# Patient Record
Sex: FEMALE | Race: OTHER
[De-identification: names, ages, dates, MRNs, and addresses within clinical notes are randomized per-mention and may not be internally consistent; named-entity substitution may affect disease eponyms.]

---

## 2020-02-04 ENCOUNTER — ASOB RESULT (OUTPATIENT)
Age: 25
End: 2020-02-04

## 2020-02-04 ENCOUNTER — APPOINTMENT (OUTPATIENT)
Dept: ANTEPARTUM | Facility: CLINIC | Age: 25
End: 2020-02-04
Payer: MEDICAID

## 2020-02-04 PROBLEM — Z00.00 ENCOUNTER FOR PREVENTIVE HEALTH EXAMINATION: Status: ACTIVE | Noted: 2020-02-04

## 2020-02-04 PROCEDURE — 76805 OB US >/= 14 WKS SNGL FETUS: CPT

## 2020-02-04 PROCEDURE — 76817 TRANSVAGINAL US OBSTETRIC: CPT

## 2020-02-25 ENCOUNTER — APPOINTMENT (OUTPATIENT)
Dept: PEDIATRIC CARDIOLOGY | Facility: CLINIC | Age: 25
End: 2020-02-25
Payer: MEDICAID

## 2020-02-25 DIAGNOSIS — O35.9XX0 MATERNAL CARE FOR (SUSPECTED) FETAL ABNORMALITY AND DAMAGE, UNSPECIFIED, NOT APPLICABLE OR UNSPECIFIED: ICD-10-CM

## 2020-02-25 DIAGNOSIS — Z3A.23 23 WEEKS GESTATION OF PREGNANCY: ICD-10-CM

## 2020-02-25 DIAGNOSIS — O09.899 SUPERVISION OF OTHER HIGH RISK PREGNANCIES, UNSPECIFIED TRIMESTER: ICD-10-CM

## 2020-02-25 PROCEDURE — 93325 DOPPLER ECHO COLOR FLOW MAPG: CPT | Mod: 59

## 2020-02-25 PROCEDURE — 76825 ECHO EXAM OF FETAL HEART: CPT

## 2020-02-25 PROCEDURE — 76827 ECHO EXAM OF FETAL HEART: CPT

## 2020-02-25 PROCEDURE — 99204 OFFICE O/P NEW MOD 45 MIN: CPT | Mod: 25

## 2020-02-25 PROCEDURE — 76821 MIDDLE CEREBRAL ARTERY ECHO: CPT

## 2020-02-25 PROCEDURE — 76820 UMBILICAL ARTERY ECHO: CPT

## 2020-02-25 RX ORDER — GLUC/MSM/COLGN2/HYAL/ANTIARTH3 375-375-20
TABLET ORAL
Refills: 0 | Status: ACTIVE | COMMUNITY

## 2020-02-25 RX ORDER — CHROMIUM 200 MCG
TABLET ORAL
Refills: 0 | Status: ACTIVE | COMMUNITY

## 2020-02-25 RX ORDER — PNV/FERROUS SULFATE/FOLIC ACID 27-<0.5MG
TABLET ORAL
Refills: 0 | Status: ACTIVE | COMMUNITY

## 2020-03-31 ENCOUNTER — APPOINTMENT (OUTPATIENT)
Dept: ANTEPARTUM | Facility: CLINIC | Age: 25
End: 2020-03-31

## 2020-04-07 ENCOUNTER — APPOINTMENT (OUTPATIENT)
Dept: ANTEPARTUM | Facility: CLINIC | Age: 25
End: 2020-04-07
Payer: MEDICAID

## 2020-04-07 ENCOUNTER — ASOB RESULT (OUTPATIENT)
Age: 25
End: 2020-04-07

## 2020-04-07 PROCEDURE — 76816 OB US FOLLOW-UP PER FETUS: CPT

## 2020-05-13 ENCOUNTER — APPOINTMENT (OUTPATIENT)
Dept: ANTEPARTUM | Facility: CLINIC | Age: 25
End: 2020-05-13
Payer: MEDICAID

## 2020-05-13 ENCOUNTER — ASOB RESULT (OUTPATIENT)
Age: 25
End: 2020-05-13

## 2020-05-13 PROCEDURE — 76816 OB US FOLLOW-UP PER FETUS: CPT

## 2020-05-13 PROCEDURE — 76819 FETAL BIOPHYS PROFIL W/O NST: CPT

## 2020-05-13 PROCEDURE — 76820 UMBILICAL ARTERY ECHO: CPT

## 2020-06-10 ENCOUNTER — APPOINTMENT (OUTPATIENT)
Dept: ANTEPARTUM | Facility: CLINIC | Age: 25
End: 2020-06-10
Payer: MEDICAID

## 2020-06-10 ENCOUNTER — ASOB RESULT (OUTPATIENT)
Age: 25
End: 2020-06-10

## 2020-06-10 PROCEDURE — 76819 FETAL BIOPHYS PROFIL W/O NST: CPT

## 2020-06-10 PROCEDURE — 76816 OB US FOLLOW-UP PER FETUS: CPT

## 2020-06-10 PROCEDURE — 76820 UMBILICAL ARTERY ECHO: CPT

## 2020-06-15 ENCOUNTER — OUTPATIENT (OUTPATIENT)
Dept: OUTPATIENT SERVICES | Facility: HOSPITAL | Age: 25
LOS: 1 days | End: 2020-06-15
Payer: MEDICAID

## 2020-06-15 DIAGNOSIS — Z01.818 ENCOUNTER FOR OTHER PREPROCEDURAL EXAMINATION: ICD-10-CM

## 2020-06-15 LAB
BASOPHILS # BLD AUTO: 0.03 K/UL — SIGNIFICANT CHANGE UP (ref 0–0.2)
BASOPHILS NFR BLD AUTO: 0.4 % — SIGNIFICANT CHANGE UP (ref 0–2)
EOSINOPHIL # BLD AUTO: 0.07 K/UL — SIGNIFICANT CHANGE UP (ref 0–0.5)
EOSINOPHIL NFR BLD AUTO: 0.9 % — SIGNIFICANT CHANGE UP (ref 0–6)
HCT VFR BLD CALC: 34.7 % — SIGNIFICANT CHANGE UP (ref 34.5–45)
HGB BLD-MCNC: 10.2 G/DL — LOW (ref 11.5–15.5)
IMM GRANULOCYTES NFR BLD AUTO: 0.6 % — SIGNIFICANT CHANGE UP (ref 0–1.5)
LYMPHOCYTES # BLD AUTO: 1.86 K/UL — SIGNIFICANT CHANGE UP (ref 1–3.3)
LYMPHOCYTES # BLD AUTO: 23.5 % — SIGNIFICANT CHANGE UP (ref 13–44)
MCHC RBC-ENTMCNC: 22.9 PG — LOW (ref 27–34)
MCHC RBC-ENTMCNC: 29.4 GM/DL — LOW (ref 32–36)
MCV RBC AUTO: 77.8 FL — LOW (ref 80–100)
MONOCYTES # BLD AUTO: 0.57 K/UL — SIGNIFICANT CHANGE UP (ref 0–0.9)
MONOCYTES NFR BLD AUTO: 7.2 % — SIGNIFICANT CHANGE UP (ref 2–14)
NEUTROPHILS # BLD AUTO: 5.34 K/UL — SIGNIFICANT CHANGE UP (ref 1.8–7.4)
NEUTROPHILS NFR BLD AUTO: 67.4 % — SIGNIFICANT CHANGE UP (ref 43–77)
PLATELET # BLD AUTO: 225 K/UL — SIGNIFICANT CHANGE UP (ref 150–400)
RBC # BLD: 4.46 M/UL — SIGNIFICANT CHANGE UP (ref 3.8–5.2)
RBC # FLD: 16.8 % — HIGH (ref 10.3–14.5)
WBC # BLD: 7.92 K/UL — SIGNIFICANT CHANGE UP (ref 3.8–10.5)
WBC # FLD AUTO: 7.92 K/UL — SIGNIFICANT CHANGE UP (ref 3.8–10.5)

## 2020-06-15 PROCEDURE — 86900 BLOOD TYPING SEROLOGIC ABO: CPT

## 2020-06-15 PROCEDURE — 85025 COMPLETE CBC W/AUTO DIFF WBC: CPT

## 2020-06-15 PROCEDURE — 36415 COLL VENOUS BLD VENIPUNCTURE: CPT

## 2020-06-15 PROCEDURE — 86920 COMPATIBILITY TEST SPIN: CPT

## 2020-06-15 PROCEDURE — 86901 BLOOD TYPING SEROLOGIC RH(D): CPT

## 2020-06-15 PROCEDURE — 86850 RBC ANTIBODY SCREEN: CPT

## 2020-06-15 PROCEDURE — U0003: CPT

## 2020-06-16 DIAGNOSIS — Z01.818 ENCOUNTER FOR OTHER PREPROCEDURAL EXAMINATION: ICD-10-CM

## 2020-06-16 LAB — SARS-COV-2 RNA SPEC QL NAA+PROBE: SIGNIFICANT CHANGE UP

## 2020-06-17 ENCOUNTER — INPATIENT (INPATIENT)
Facility: HOSPITAL | Age: 25
LOS: 2 days | Discharge: HOME | DRG: 540 | End: 2020-06-20
Attending: OBSTETRICS & GYNECOLOGY | Admitting: OBSTETRICS & GYNECOLOGY
Payer: MEDICAID

## 2020-06-17 ENCOUNTER — TRANSCRIPTION ENCOUNTER (OUTPATIENT)
Age: 25
End: 2020-06-17

## 2020-06-17 VITALS — HEIGHT: 65 IN | WEIGHT: 165.35 LBS

## 2020-06-17 DIAGNOSIS — Z3A.00 WEEKS OF GESTATION OF PREGNANCY NOT SPECIFIED: ICD-10-CM

## 2020-06-17 LAB — T PALLIDUM AB TITR SER: NEGATIVE — SIGNIFICANT CHANGE UP

## 2020-06-17 PROCEDURE — 59050 FETAL MONITOR W/REPORT: CPT

## 2020-06-17 PROCEDURE — 36415 COLL VENOUS BLD VENIPUNCTURE: CPT

## 2020-06-17 PROCEDURE — 59514 CESAREAN DELIVERY ONLY: CPT | Mod: 80,U7

## 2020-06-17 PROCEDURE — 86780 TREPONEMA PALLIDUM: CPT

## 2020-06-17 PROCEDURE — 94760 N-INVAS EAR/PLS OXIMETRY 1: CPT

## 2020-06-17 PROCEDURE — 85025 COMPLETE CBC W/AUTO DIFF WBC: CPT

## 2020-06-17 RX ORDER — ACETAMINOPHEN 500 MG
1000 TABLET ORAL ONCE
Refills: 0 | Status: COMPLETED | OUTPATIENT
Start: 2020-06-17 | End: 2020-06-17

## 2020-06-17 RX ORDER — TETANUS TOXOID, REDUCED DIPHTHERIA TOXOID AND ACELLULAR PERTUSSIS VACCINE, ADSORBED 5; 2.5; 8; 8; 2.5 [IU]/.5ML; [IU]/.5ML; UG/.5ML; UG/.5ML; UG/.5ML
0.5 SUSPENSION INTRAMUSCULAR ONCE
Refills: 0 | Status: DISCONTINUED | OUTPATIENT
Start: 2020-06-17 | End: 2020-06-20

## 2020-06-17 RX ORDER — ENOXAPARIN SODIUM 100 MG/ML
40 INJECTION SUBCUTANEOUS DAILY
Refills: 0 | Status: DISCONTINUED | OUTPATIENT
Start: 2020-06-17 | End: 2020-06-20

## 2020-06-17 RX ORDER — METOCLOPRAMIDE HCL 10 MG
10 TABLET ORAL ONCE
Refills: 0 | Status: COMPLETED | OUTPATIENT
Start: 2020-06-17 | End: 2020-06-17

## 2020-06-17 RX ORDER — OXYCODONE HYDROCHLORIDE 5 MG/1
5 TABLET ORAL
Refills: 0 | Status: DISCONTINUED | OUTPATIENT
Start: 2020-06-17 | End: 2020-06-20

## 2020-06-17 RX ORDER — SODIUM CHLORIDE 9 MG/ML
1000 INJECTION, SOLUTION INTRAVENOUS ONCE
Refills: 0 | Status: COMPLETED | OUTPATIENT
Start: 2020-06-17 | End: 2020-06-17

## 2020-06-17 RX ORDER — DIPHENHYDRAMINE HCL 50 MG
25 CAPSULE ORAL EVERY 6 HOURS
Refills: 0 | Status: DISCONTINUED | OUTPATIENT
Start: 2020-06-17 | End: 2020-06-20

## 2020-06-17 RX ORDER — IBUPROFEN 200 MG
600 TABLET ORAL EVERY 6 HOURS
Refills: 0 | Status: DISCONTINUED | OUTPATIENT
Start: 2020-06-17 | End: 2020-06-20

## 2020-06-17 RX ORDER — OXYCODONE HYDROCHLORIDE 5 MG/1
5 TABLET ORAL ONCE
Refills: 0 | Status: DISCONTINUED | OUTPATIENT
Start: 2020-06-17 | End: 2020-06-20

## 2020-06-17 RX ORDER — ONDANSETRON 8 MG/1
4 TABLET, FILM COATED ORAL EVERY 6 HOURS
Refills: 0 | Status: DISCONTINUED | OUTPATIENT
Start: 2020-06-17 | End: 2020-06-20

## 2020-06-17 RX ORDER — HYDROMORPHONE HYDROCHLORIDE 2 MG/ML
1 INJECTION INTRAMUSCULAR; INTRAVENOUS; SUBCUTANEOUS
Refills: 0 | Status: DISCONTINUED | OUTPATIENT
Start: 2020-06-17 | End: 2020-06-20

## 2020-06-17 RX ORDER — SODIUM CHLORIDE 9 MG/ML
1000 INJECTION, SOLUTION INTRAVENOUS
Refills: 0 | Status: DISCONTINUED | OUTPATIENT
Start: 2020-06-17 | End: 2020-06-17

## 2020-06-17 RX ORDER — NALOXONE HYDROCHLORIDE 4 MG/.1ML
0.1 SPRAY NASAL
Refills: 0 | Status: DISCONTINUED | OUTPATIENT
Start: 2020-06-17 | End: 2020-06-20

## 2020-06-17 RX ORDER — ACETAMINOPHEN 500 MG
975 TABLET ORAL
Refills: 0 | Status: DISCONTINUED | OUTPATIENT
Start: 2020-06-17 | End: 2020-06-20

## 2020-06-17 RX ORDER — OXYCODONE HYDROCHLORIDE 5 MG/1
10 TABLET ORAL
Refills: 0 | Status: DISCONTINUED | OUTPATIENT
Start: 2020-06-17 | End: 2020-06-20

## 2020-06-17 RX ORDER — SIMETHICONE 80 MG/1
80 TABLET, CHEWABLE ORAL EVERY 4 HOURS
Refills: 0 | Status: DISCONTINUED | OUTPATIENT
Start: 2020-06-17 | End: 2020-06-20

## 2020-06-17 RX ORDER — OXYTOCIN 10 UNIT/ML
333.33 VIAL (ML) INJECTION
Qty: 20 | Refills: 0 | Status: DISCONTINUED | OUTPATIENT
Start: 2020-06-17 | End: 2020-06-17

## 2020-06-17 RX ORDER — FAMOTIDINE 10 MG/ML
20 INJECTION INTRAVENOUS ONCE
Refills: 0 | Status: COMPLETED | OUTPATIENT
Start: 2020-06-17 | End: 2020-06-17

## 2020-06-17 RX ORDER — MORPHINE SULFATE 50 MG/1
0.15 CAPSULE, EXTENDED RELEASE ORAL ONCE
Refills: 0 | Status: DISCONTINUED | OUTPATIENT
Start: 2020-06-17 | End: 2020-06-20

## 2020-06-17 RX ORDER — LANOLIN
1 OINTMENT (GRAM) TOPICAL EVERY 6 HOURS
Refills: 0 | Status: DISCONTINUED | OUTPATIENT
Start: 2020-06-17 | End: 2020-06-20

## 2020-06-17 RX ORDER — KETOROLAC TROMETHAMINE 30 MG/ML
30 SYRINGE (ML) INJECTION ONCE
Refills: 0 | Status: DISCONTINUED | OUTPATIENT
Start: 2020-06-17 | End: 2020-06-20

## 2020-06-17 RX ORDER — CITRIC ACID/SODIUM CITRATE 300-500 MG
30 SOLUTION, ORAL ORAL ONCE
Refills: 0 | Status: COMPLETED | OUTPATIENT
Start: 2020-06-17 | End: 2020-06-17

## 2020-06-17 RX ORDER — MAGNESIUM HYDROXIDE 400 MG/1
30 TABLET, CHEWABLE ORAL
Refills: 0 | Status: DISCONTINUED | OUTPATIENT
Start: 2020-06-17 | End: 2020-06-20

## 2020-06-17 RX ORDER — IBUPROFEN 200 MG
600 TABLET ORAL EVERY 6 HOURS
Refills: 0 | Status: COMPLETED | OUTPATIENT
Start: 2020-06-17 | End: 2021-05-16

## 2020-06-17 RX ORDER — OXYTOCIN 10 UNIT/ML
333.33 VIAL (ML) INJECTION
Qty: 20 | Refills: 0 | Status: DISCONTINUED | OUTPATIENT
Start: 2020-06-17 | End: 2020-06-20

## 2020-06-17 RX ADMIN — Medication 975 MILLIGRAM(S): at 21:09

## 2020-06-17 RX ADMIN — Medication 10 MILLIGRAM(S): at 08:21

## 2020-06-17 RX ADMIN — ENOXAPARIN SODIUM 40 MILLIGRAM(S): 100 INJECTION SUBCUTANEOUS at 21:09

## 2020-06-17 RX ADMIN — Medication 30 MILLILITER(S): at 08:50

## 2020-06-17 RX ADMIN — OXYCODONE HYDROCHLORIDE 5 MILLIGRAM(S): 5 TABLET ORAL at 21:08

## 2020-06-17 RX ADMIN — FAMOTIDINE 20 MILLIGRAM(S): 10 INJECTION INTRAVENOUS at 08:23

## 2020-06-17 RX ADMIN — SODIUM CHLORIDE 2000 MILLILITER(S): 9 INJECTION, SOLUTION INTRAVENOUS at 08:16

## 2020-06-17 RX ADMIN — Medication 400 MILLIGRAM(S): at 11:15

## 2020-06-17 RX ADMIN — Medication 1000 MILLIUNIT(S)/MIN: at 12:07

## 2020-06-17 NOTE — DISCHARGE NOTE OB - PLAN OF CARE
recovery no heavy lifting for six weeks.  Follow up in office as scheduled, 7 days after surgery for postop wound check.

## 2020-06-17 NOTE — DISCHARGE NOTE OB - PATIENT PORTAL LINK FT
You can access the FollowMyHealth Patient Portal offered by [facility] by registering at the following website: [email or web address]. By joining [facility]’s FollowMyHealth portal, you will also be able to view your health information using other applications (apps) compatible with our system.

## 2020-06-17 NOTE — DISCHARGE NOTE OB - CARE PLAN
Principal Discharge DX:	 delivery, delivered, current hospitalization  Goal:	recovery  Assessment and plan of treatment:	no heavy lifting for six weeks.  Follow up in office as scheduled, 7 days after surgery for postop wound check.

## 2020-06-17 NOTE — DISCHARGE NOTE OB - CARE PROVIDER_API CALL
Genaro Carrion  OBSTETRICS AND GYNECOLOGY  [address] [place] [address]  Phone: [phone]  Fax: [phone]  Established Patient  Follow Up Time: 1 week

## 2020-06-17 NOTE — PATIENT PROFILE OB - ALERT: PERTINENT HISTORY
Follow up Sonogram for Growth/Ultra Screen at 12 Weeks/20 Week Level II Sonogram/BioPhysical Profile(s)/1st Trimester Sonogram/Fetal Non-Stress Test (NST)/Fetal Sonogram

## 2020-06-17 NOTE — DISCHARGE NOTE OB - MEDICATION SUMMARY - MEDICATIONS TO TAKE
I will START or STAY ON the medications listed below when I get home from the hospital:    Prenatal 1 Plus 1 oral tablet  -- 1 tab(s) by mouth once a day  -- Indication: For Continue daily

## 2020-06-17 NOTE — DISCHARGE NOTE OB - HOSPITAL COURSE
26 yo  at 39 3/7 weeks gestation, GBS negative presents for scheduled repeat  with h/o two previous c-sections, declining a TOLAC.  Her CBC upon admission was:                        10.2   7.92  )-----------( 225      ( 15 2020 10:03 )             34.7   Covid testing was negative. 26 yo  at 39 3/7 weeks gestation, GBS negative presents for scheduled repeat  with h/o two previous c-sections, declining a TOLAC.  Her CBC upon admission was:                        10.2   7.92  )-----------( 225      ( 15 2020 10:03 )             34.7   Covid testing was negative.  An uncomplicated repeat  was performed under spinal anaesthesia delivering a viable male infant at 0926am,  from OT position, with clear fluid, no nuchal cord, weighing 3840g (AGA), measuring 20 inches in length, Apgars 9/9. 24 yo  at 39 3/7 weeks gestation, GBS negative presents for scheduled repeat  with h/o two previous c-sections, declining a TOLAC.  Her CBC upon admission was:                        10.2   7.92  )-----------( 225      ( 15 2020 10:03 )             34.7   Covid testing was negative.  An uncomplicated repeat  was performed under spinal anaesthesia delivering a viable male infant at 0926am,  from OT position, with clear fluid, no nuchal cord, weighing 3840g (AGA), measuring 20 inches in length, Apgars 9/9.  Pt's recovery was uncomplicated.  Her postop CBC:  9.3/32/6.  She is being discharged home in stable condition with her  infant.

## 2020-06-18 LAB
BASOPHILS # BLD AUTO: 0.04 K/UL — SIGNIFICANT CHANGE UP (ref 0–0.2)
BASOPHILS NFR BLD AUTO: 0.5 % — SIGNIFICANT CHANGE UP (ref 0–2)
EOSINOPHIL # BLD AUTO: 0.05 K/UL — SIGNIFICANT CHANGE UP (ref 0–0.5)
EOSINOPHIL NFR BLD AUTO: 0.6 % — SIGNIFICANT CHANGE UP (ref 0–6)
HCT VFR BLD CALC: 32.6 % — LOW (ref 34.5–45)
HGB BLD-MCNC: 9.3 G/DL — LOW (ref 11.5–15.5)
IMM GRANULOCYTES NFR BLD AUTO: 0.5 % — SIGNIFICANT CHANGE UP (ref 0–1.5)
LYMPHOCYTES # BLD AUTO: 1.89 K/UL — SIGNIFICANT CHANGE UP (ref 1–3.3)
LYMPHOCYTES # BLD AUTO: 22.9 % — SIGNIFICANT CHANGE UP (ref 13–44)
MCHC RBC-ENTMCNC: 22.1 PG — LOW (ref 27–34)
MCHC RBC-ENTMCNC: 28.5 GM/DL — LOW (ref 32–36)
MCV RBC AUTO: 77.6 FL — LOW (ref 80–100)
MONOCYTES # BLD AUTO: 0.74 K/UL — SIGNIFICANT CHANGE UP (ref 0–0.9)
MONOCYTES NFR BLD AUTO: 9 % — SIGNIFICANT CHANGE UP (ref 2–14)
NEUTROPHILS # BLD AUTO: 5.5 K/UL — SIGNIFICANT CHANGE UP (ref 1.8–7.4)
NEUTROPHILS NFR BLD AUTO: 66.5 % — SIGNIFICANT CHANGE UP (ref 43–77)
PLATELET # BLD AUTO: 194 K/UL — SIGNIFICANT CHANGE UP (ref 150–400)
RBC # BLD: 4.2 M/UL — SIGNIFICANT CHANGE UP (ref 3.8–5.2)
RBC # FLD: 16.6 % — HIGH (ref 10.3–14.5)
WBC # BLD: 8.26 K/UL — SIGNIFICANT CHANGE UP (ref 3.8–10.5)
WBC # FLD AUTO: 8.26 K/UL — SIGNIFICANT CHANGE UP (ref 3.8–10.5)

## 2020-06-18 RX ADMIN — Medication 975 MILLIGRAM(S): at 10:21

## 2020-06-18 RX ADMIN — ENOXAPARIN SODIUM 40 MILLIGRAM(S): 100 INJECTION SUBCUTANEOUS at 22:19

## 2020-06-18 RX ADMIN — Medication 600 MILLIGRAM(S): at 18:08

## 2020-06-18 RX ADMIN — Medication 600 MILLIGRAM(S): at 12:21

## 2020-06-18 RX ADMIN — Medication 975 MILLIGRAM(S): at 03:27

## 2020-06-18 RX ADMIN — Medication 975 MILLIGRAM(S): at 22:19

## 2020-06-18 RX ADMIN — OXYCODONE HYDROCHLORIDE 5 MILLIGRAM(S): 5 TABLET ORAL at 13:49

## 2020-06-18 RX ADMIN — Medication 600 MILLIGRAM(S): at 06:06

## 2020-06-18 RX ADMIN — Medication 975 MILLIGRAM(S): at 15:48

## 2020-06-18 RX ADMIN — Medication 600 MILLIGRAM(S): at 00:25

## 2020-06-18 NOTE — PROGRESS NOTE ADULT - SUBJECTIVE AND OBJECTIVE BOX
Postpartum Note,  Section  25y woman post-operative day:  #1    Subjective:  She is ambulating and tolerating regular diet.  Pt denies dizziness  She denies nausea and vomiting and is not yet passing flatus.    Physical exam:    Vital Signs Last 24 Hrs  T(C): 36.7 (2020 08:11), Max: 36.9 (2020 17:00)  T(F): 98 (2020 08:11), Max: 98.4 (2020 17:00)  HR: 63 (2020 08:11) (60 - 92)  BP: 91/56 (2020 08:11) (91/56 - 115/66)  BP(mean): 85 (2020 10:15) (75 - 88)  RR: 18 (2020 08:11) (16 - 23)  SpO2: 98% (2020 08:11) (96% - 100%)  Lungs:  clear to auscultation bilaterally.  Abdomen: Soft, nontender, nondistended, bowel sounds normoactive  Incision: Clean, dry, and intact.  Pelvic: light  lochia noted  Ext: No calf tenderness    LABS:                        9.3    8.26  )-----------( 194      ( 2020 06:59 )             32.6         Assessment and Plan  POD # 1  s/p     Doing well.  Encourage ambulation.

## 2020-06-19 RX ADMIN — Medication 600 MILLIGRAM(S): at 11:43

## 2020-06-19 RX ADMIN — Medication 600 MILLIGRAM(S): at 17:36

## 2020-06-19 RX ADMIN — Medication 975 MILLIGRAM(S): at 22:04

## 2020-06-19 RX ADMIN — Medication 600 MILLIGRAM(S): at 06:18

## 2020-06-19 RX ADMIN — Medication 975 MILLIGRAM(S): at 08:55

## 2020-06-19 RX ADMIN — ENOXAPARIN SODIUM 40 MILLIGRAM(S): 100 INJECTION SUBCUTANEOUS at 22:04

## 2020-06-19 RX ADMIN — Medication 600 MILLIGRAM(S): at 00:25

## 2020-06-19 RX ADMIN — Medication 975 MILLIGRAM(S): at 03:50

## 2020-06-19 NOTE — PROGRESS NOTE ADULT - SUBJECTIVE AND OBJECTIVE BOX
POD #2    Pt is a 26yo P3 s/p repeat .  Pt is doing well, pain is well controlled. Ambulating around her room this morning. Has  Breastfeeding, and bottle feeding.     PNC with Dr. Carrion  ICU Vital Signs Last 24 Hrs  T(C): 36.9 (2020 07:50), Max: 36.9 (2020 07:50)  T(F): 98.4 (2020 07:50), Max: 98.4 (2020 07:50)  HR: 60 (2020 07:50) (60 - 84)  BP: 113/74 (2020 07:50) (105/62 - 117/77)  RR: 18 (2020 07:50) (18 - 18)  SpO2: 98% (2020 07:50) (98% - 100%)             post-op             9.3    8.26  )-----------( 194      ( 2020 06:59 )             32.6     Breasts: soft nontender, lactating, no masses or red streaks  Heart: RRR  Lungs: CTAB  Abdomen: bowel sounds x 4 quadrants, fundus 1cm below umbilicus  Mariia: minimal lochia  Extremities: nontender

## 2020-06-19 NOTE — PROGRESS NOTE ADULT - ASSESSMENT
26yo P3 sp repeat , POD #2, normal exam  Encourage ambulation  Encourage exclusive breastfeeding  Routine postpartum care  Keep incision clean and dry

## 2020-06-20 VITALS
HEART RATE: 68 BPM | SYSTOLIC BLOOD PRESSURE: 123 MMHG | DIASTOLIC BLOOD PRESSURE: 76 MMHG | TEMPERATURE: 98 F | OXYGEN SATURATION: 98 % | RESPIRATION RATE: 18 BRPM

## 2020-06-20 RX ORDER — DOCUSATE SODIUM 100 MG
1 CAPSULE ORAL
Qty: 60 | Refills: 0
Start: 2020-06-20

## 2020-06-20 RX ADMIN — Medication 600 MILLIGRAM(S): at 06:09

## 2020-06-20 RX ADMIN — Medication 975 MILLIGRAM(S): at 09:26

## 2020-06-20 RX ADMIN — Medication 975 MILLIGRAM(S): at 15:27

## 2020-06-20 RX ADMIN — Medication 600 MILLIGRAM(S): at 12:04

## 2020-06-20 NOTE — PROGRESS NOTE ADULT - SUBJECTIVE AND OBJECTIVE BOX
Postpartum Note,  Section  25y woman post-operative day:  #3    Subjective:  She is ambulating and tolerating regular diet.  Pt denies dizziness  She denies nausea and vomiting and is passing flatus.    Physical exam:    Vital Signs Last 24 Hrs  T(C): 36.7 (2020 08:30), Max: 36.8 (2020 20:36)  T(F): 98 (2020 08:30), Max: 98.3 (2020 20:36)  HR: 68 (2020 08:30) (57 - 68)  BP: 123/76 (2020 08:30) (117/77 - 133/76)  BP(mean): 95 (2020 05:47) (95 - 95)  RR: 18 (2020 08:30) (18 - 18)  SpO2: 98% (2020 08:30) (98% - 99%)  Lungs:  clear to auscultation bilaterally.  Abdomen: Soft, nontender, nondistended, bowel sounds normoactive  Incision: Clean, dry, and intact.  Pelvic: light  lochia noted  Ext: No calf tenderness    LABS:        Assessment and Plan  POD # 3  s/p     Doing well.  Encourage ambulation.  Follow up in 4-7 days for post-op incision check, then in six weeks for postpartum appointment.  Pt advised to follow up sooner as an outpt if she has any difficulties with breast feeding/depression/temperature over 100.2'F.

## 2020-06-24 DIAGNOSIS — O34.211 MATERNAL CARE FOR LOW TRANSVERSE SCAR FROM PREVIOUS CESAREAN DELIVERY: ICD-10-CM

## 2020-06-24 DIAGNOSIS — Z28.09 IMMUNIZATION NOT CARRIED OUT BECAUSE OF OTHER CONTRAINDICATION: ICD-10-CM

## 2020-06-24 DIAGNOSIS — Z3A.39 39 WEEKS GESTATION OF PREGNANCY: ICD-10-CM

## 2022-02-16 NOTE — DISCHARGE NOTE OB - NS AS DC FU INST LIST INST
Comprehensive Intake Entered On:  5/23/2019 9:29 AM CDT    Performed On:  5/23/2019 9:26 AM CDT by Carmencita Cordova CMA               Summary   Chief Complaint :   c/o itching and spotting between periods   Menstrual Status :   Menarcheal   Weight Measured :   175.6 lb(Converted to: 175 lb 10 oz, 79.65 kg)    Height Measured :   65.75 in(Converted to: 5 ft 6 in, 167.00 cm)    Body Mass Index :   28.56 kg/m2   Body Surface Area :   1.92 m2   Systolic Blood Pressure :   114 mmHg   Diastolic Blood Pressure :   68 mmHg   Mean Arterial Pressure :   83 mmHg   Peripheral Pulse Rate :   72 bpm   Languages :   English   Carmencita Cordova CMA - 5/23/2019 9:26 AM CDT   Health Status   Allergies Verified? :   Yes   Medication History Verified? :   Yes   Pre-Visit Planning Status :   Completed   Tobacco Use? :   Never smoker   Carmencita Cordova CMA - 5/23/2019 9:26 AM CDT   Consents   Consent for Immunization Exchange :   Consent Granted   Consent for Immunizations to Providers :   Consent Granted   Carmencita Cordova CMA - 5/23/2019 9:26 AM CDT   Meds / Allergies   (As Of: 5/23/2019 9:29:54 AM CDT)   Allergies (Active)   No Known Medication Allergies  Estimated Onset Date:   Unspecified ; Created By:   Tamra Booker CMA; Reaction Status:   Active ; Category:   Drug ; Substance:   No Known Medication Allergies ; Type:   Allergy ; Updated By:   Tamra Booker CMA; Reviewed Date:   2/4/2019 12:05 PM CST        Medication List   (As Of: 5/23/2019 9:29:54 AM CDT)   Prescription/Discharge Order    azithromycin  :   azithromycin ; Status:   Processing ; Ordered As Mnemonic:   Zithromax 500 mg oral tablet ; Ordering Provider:   Vivi Aparicio MD; Action Display:   Complete ; Catalog Code:   azithromycin ; Order Dt/Tm:   5/23/2019 9:28:38 AM   no

## 2023-01-06 NOTE — DISCHARGE NOTE OB - PROCEDURE 1
Cindy Contreras presents to clinic today at the request of Idania Herrera MD (ordering provider) for Allergy Immunotherapy injection(s).       This service provided today was under the care of Idania Herrera MD; the supervising provider of the day; who was available if needed.      Patient presented after waiting 30 minutes with no reaction to  injections. Discharged from clinic.    Sheridan Kelly RN    
 delivery

## 2023-02-24 ENCOUNTER — APPOINTMENT (OUTPATIENT)
Dept: ANTEPARTUM | Facility: CLINIC | Age: 28
End: 2023-02-24

## 2023-03-24 ENCOUNTER — APPOINTMENT (OUTPATIENT)
Dept: ANTEPARTUM | Facility: CLINIC | Age: 28
End: 2023-03-24

## 2023-04-21 ENCOUNTER — ASOB RESULT (OUTPATIENT)
Age: 28
End: 2023-04-21

## 2023-04-21 ENCOUNTER — APPOINTMENT (OUTPATIENT)
Dept: ANTEPARTUM | Facility: CLINIC | Age: 28
End: 2023-04-21
Payer: MEDICAID

## 2023-04-21 PROCEDURE — 76805 OB US >/= 14 WKS SNGL FETUS: CPT

## 2023-08-28 ENCOUNTER — OUTPATIENT (OUTPATIENT)
Dept: OUTPATIENT SERVICES | Facility: HOSPITAL | Age: 28
LOS: 1 days | End: 2023-08-28
Payer: MEDICAID

## 2023-08-28 DIAGNOSIS — Z01.818 ENCOUNTER FOR OTHER PREPROCEDURAL EXAMINATION: ICD-10-CM

## 2023-08-28 DIAGNOSIS — O34.211 MATERNAL CARE FOR LOW TRANSVERSE SCAR FROM PREVIOUS CESAREAN DELIVERY: ICD-10-CM

## 2023-08-28 LAB
ANISOCYTOSIS BLD QL: SLIGHT — SIGNIFICANT CHANGE UP
BASOPHILS # BLD AUTO: 0.02 K/UL — SIGNIFICANT CHANGE UP (ref 0–0.2)
BASOPHILS NFR BLD AUTO: 0.3 % — SIGNIFICANT CHANGE UP (ref 0–2)
BLD GP AB SCN SERPL QL: SIGNIFICANT CHANGE UP
EOSINOPHIL # BLD AUTO: 0.08 K/UL — SIGNIFICANT CHANGE UP (ref 0–0.5)
EOSINOPHIL NFR BLD AUTO: 1.1 % — SIGNIFICANT CHANGE UP (ref 0–6)
HCT VFR BLD CALC: 36.1 % — SIGNIFICANT CHANGE UP (ref 34.5–45)
HGB BLD-MCNC: 10.9 G/DL — LOW (ref 11.5–15.5)
HYPOCHROMIA BLD QL: SLIGHT — SIGNIFICANT CHANGE UP
IMM GRANULOCYTES NFR BLD AUTO: 0.6 % — SIGNIFICANT CHANGE UP (ref 0–0.9)
LYMPHOCYTES # BLD AUTO: 1.75 K/UL — SIGNIFICANT CHANGE UP (ref 1–3.3)
LYMPHOCYTES # BLD AUTO: 24.8 % — SIGNIFICANT CHANGE UP (ref 13–44)
MACROCYTES BLD QL: SLIGHT — SIGNIFICANT CHANGE UP
MANUAL SMEAR VERIFICATION: SIGNIFICANT CHANGE UP
MCHC RBC-ENTMCNC: 23.9 PG — LOW (ref 27–34)
MCHC RBC-ENTMCNC: 30.2 GM/DL — LOW (ref 32–36)
MCV RBC AUTO: 79.2 FL — LOW (ref 80–100)
MICROCYTES BLD QL: SLIGHT — SIGNIFICANT CHANGE UP
MONOCYTES # BLD AUTO: 0.47 K/UL — SIGNIFICANT CHANGE UP (ref 0–0.9)
MONOCYTES NFR BLD AUTO: 6.7 % — SIGNIFICANT CHANGE UP (ref 2–14)
NEUTROPHILS # BLD AUTO: 4.69 K/UL — SIGNIFICANT CHANGE UP (ref 1.8–7.4)
NEUTROPHILS NFR BLD AUTO: 66.5 % — SIGNIFICANT CHANGE UP (ref 43–77)
OVALOCYTES BLD QL SMEAR: SLIGHT — SIGNIFICANT CHANGE UP
PLAT MORPH BLD: NORMAL — SIGNIFICANT CHANGE UP
PLATELET # BLD AUTO: 243 K/UL — SIGNIFICANT CHANGE UP (ref 150–400)
POIKILOCYTOSIS BLD QL AUTO: SLIGHT — SIGNIFICANT CHANGE UP
POLYCHROMASIA BLD QL SMEAR: SLIGHT — SIGNIFICANT CHANGE UP
RBC # BLD: 4.56 M/UL — SIGNIFICANT CHANGE UP (ref 3.8–5.2)
RBC # FLD: 21.2 % — HIGH (ref 10.3–14.5)
RBC BLD AUTO: ABNORMAL
WBC # BLD: 7.05 K/UL — SIGNIFICANT CHANGE UP (ref 3.8–10.5)
WBC # FLD AUTO: 7.05 K/UL — SIGNIFICANT CHANGE UP (ref 3.8–10.5)

## 2023-08-28 PROCEDURE — 85025 COMPLETE CBC W/AUTO DIFF WBC: CPT

## 2023-08-28 PROCEDURE — 86850 RBC ANTIBODY SCREEN: CPT

## 2023-08-28 PROCEDURE — 86901 BLOOD TYPING SEROLOGIC RH(D): CPT

## 2023-08-28 PROCEDURE — 86900 BLOOD TYPING SEROLOGIC ABO: CPT

## 2023-08-28 PROCEDURE — 36415 COLL VENOUS BLD VENIPUNCTURE: CPT

## 2023-08-29 ENCOUNTER — INPATIENT (INPATIENT)
Facility: HOSPITAL | Age: 28
LOS: 1 days | Discharge: HOME | DRG: 540 | End: 2023-08-31
Attending: OBSTETRICS & GYNECOLOGY | Admitting: OBSTETRICS & GYNECOLOGY
Payer: MEDICAID

## 2023-08-29 VITALS — HEIGHT: 64 IN | WEIGHT: 201.94 LBS

## 2023-08-29 DIAGNOSIS — Z01.818 ENCOUNTER FOR OTHER PREPROCEDURAL EXAMINATION: ICD-10-CM

## 2023-08-29 DIAGNOSIS — O34.211 MATERNAL CARE FOR LOW TRANSVERSE SCAR FROM PREVIOUS CESAREAN DELIVERY: ICD-10-CM

## 2023-08-29 LAB — T PALLIDUM AB TITR SER: NEGATIVE — SIGNIFICANT CHANGE UP

## 2023-08-29 PROCEDURE — 59050 FETAL MONITOR W/REPORT: CPT

## 2023-08-29 PROCEDURE — 36415 COLL VENOUS BLD VENIPUNCTURE: CPT

## 2023-08-29 PROCEDURE — 85025 COMPLETE CBC W/AUTO DIFF WBC: CPT

## 2023-08-29 PROCEDURE — 88302 TISSUE EXAM BY PATHOLOGIST: CPT | Mod: 26

## 2023-08-29 PROCEDURE — 88302 TISSUE EXAM BY PATHOLOGIST: CPT

## 2023-08-29 PROCEDURE — 86780 TREPONEMA PALLIDUM: CPT

## 2023-08-29 PROCEDURE — 94760 N-INVAS EAR/PLS OXIMETRY 1: CPT

## 2023-08-29 RX ORDER — SODIUM CHLORIDE 9 MG/ML
1000 INJECTION, SOLUTION INTRAVENOUS ONCE
Refills: 0 | Status: DISCONTINUED | OUTPATIENT
Start: 2023-08-29 | End: 2023-08-29

## 2023-08-29 RX ORDER — OXYTOCIN 10 UNIT/ML
333.33 VIAL (ML) INJECTION
Qty: 20 | Refills: 0 | Status: DISCONTINUED | OUTPATIENT
Start: 2023-08-29 | End: 2023-08-31

## 2023-08-29 RX ORDER — OXYCODONE HYDROCHLORIDE 5 MG/1
5 TABLET ORAL
Refills: 0 | Status: DISCONTINUED | OUTPATIENT
Start: 2023-08-29 | End: 2023-08-29

## 2023-08-29 RX ORDER — MAGNESIUM HYDROXIDE 400 MG/1
30 TABLET, CHEWABLE ORAL
Refills: 0 | Status: DISCONTINUED | OUTPATIENT
Start: 2023-08-29 | End: 2023-08-31

## 2023-08-29 RX ORDER — ONDANSETRON 8 MG/1
4 TABLET, FILM COATED ORAL EVERY 6 HOURS
Refills: 0 | Status: DISCONTINUED | OUTPATIENT
Start: 2023-08-29 | End: 2023-08-31

## 2023-08-29 RX ORDER — NALOXONE HYDROCHLORIDE 4 MG/.1ML
0.1 SPRAY NASAL
Refills: 0 | Status: DISCONTINUED | OUTPATIENT
Start: 2023-08-29 | End: 2023-08-31

## 2023-08-29 RX ORDER — FAMOTIDINE 10 MG/ML
20 INJECTION INTRAVENOUS ONCE
Refills: 0 | Status: COMPLETED | OUTPATIENT
Start: 2023-08-29 | End: 2023-08-29

## 2023-08-29 RX ORDER — OXYCODONE HYDROCHLORIDE 5 MG/1
5 TABLET ORAL ONCE
Refills: 0 | Status: DISCONTINUED | OUTPATIENT
Start: 2023-08-29 | End: 2023-08-31

## 2023-08-29 RX ORDER — TETANUS TOXOID, REDUCED DIPHTHERIA TOXOID AND ACELLULAR PERTUSSIS VACCINE, ADSORBED 5; 2.5; 8; 8; 2.5 [IU]/.5ML; [IU]/.5ML; UG/.5ML; UG/.5ML; UG/.5ML
0.5 SUSPENSION INTRAMUSCULAR ONCE
Refills: 0 | Status: DISCONTINUED | OUTPATIENT
Start: 2023-08-29 | End: 2023-08-31

## 2023-08-29 RX ORDER — SODIUM CHLORIDE 9 MG/ML
1000 INJECTION, SOLUTION INTRAVENOUS
Refills: 0 | Status: DISCONTINUED | OUTPATIENT
Start: 2023-08-29 | End: 2023-08-29

## 2023-08-29 RX ORDER — IBUPROFEN 200 MG
600 TABLET ORAL EVERY 6 HOURS
Refills: 0 | Status: COMPLETED | OUTPATIENT
Start: 2023-08-29 | End: 2024-07-27

## 2023-08-29 RX ORDER — CITRIC ACID/SODIUM CITRATE 300-500 MG
30 SOLUTION, ORAL ORAL ONCE
Refills: 0 | Status: COMPLETED | OUTPATIENT
Start: 2023-08-29 | End: 2023-08-29

## 2023-08-29 RX ORDER — DEXAMETHASONE 0.5 MG/5ML
4 ELIXIR ORAL EVERY 6 HOURS
Refills: 0 | Status: DISCONTINUED | OUTPATIENT
Start: 2023-08-29 | End: 2023-08-31

## 2023-08-29 RX ORDER — DIPHENHYDRAMINE HCL 50 MG
25 CAPSULE ORAL EVERY 6 HOURS
Refills: 0 | Status: DISCONTINUED | OUTPATIENT
Start: 2023-08-29 | End: 2023-08-31

## 2023-08-29 RX ORDER — LANOLIN
1 OINTMENT (GRAM) TOPICAL EVERY 6 HOURS
Refills: 0 | Status: DISCONTINUED | OUTPATIENT
Start: 2023-08-29 | End: 2023-08-31

## 2023-08-29 RX ORDER — TRANEXAMIC ACID 100 MG/ML
1000 INJECTION, SOLUTION INTRAVENOUS ONCE
Refills: 0 | Status: COMPLETED | OUTPATIENT
Start: 2023-08-29 | End: 2023-08-29

## 2023-08-29 RX ORDER — OXYCODONE HYDROCHLORIDE 5 MG/1
5 TABLET ORAL
Refills: 0 | Status: DISCONTINUED | OUTPATIENT
Start: 2023-08-29 | End: 2023-08-31

## 2023-08-29 RX ORDER — KETOROLAC TROMETHAMINE 30 MG/ML
30 SYRINGE (ML) INJECTION EVERY 6 HOURS
Refills: 0 | Status: DISCONTINUED | OUTPATIENT
Start: 2023-08-29 | End: 2023-08-30

## 2023-08-29 RX ORDER — ENOXAPARIN SODIUM 100 MG/ML
40 INJECTION SUBCUTANEOUS EVERY 24 HOURS
Refills: 0 | Status: DISCONTINUED | OUTPATIENT
Start: 2023-08-30 | End: 2023-08-31

## 2023-08-29 RX ORDER — SIMETHICONE 80 MG/1
80 TABLET, CHEWABLE ORAL EVERY 4 HOURS
Refills: 0 | Status: DISCONTINUED | OUTPATIENT
Start: 2023-08-29 | End: 2023-08-31

## 2023-08-29 RX ORDER — SODIUM CHLORIDE 9 MG/ML
1000 INJECTION, SOLUTION INTRAVENOUS
Refills: 0 | Status: DISCONTINUED | OUTPATIENT
Start: 2023-08-29 | End: 2023-08-31

## 2023-08-29 RX ORDER — ACETAMINOPHEN 500 MG
975 TABLET ORAL
Refills: 0 | Status: DISCONTINUED | OUTPATIENT
Start: 2023-08-29 | End: 2023-08-31

## 2023-08-29 RX ORDER — METRONIDAZOLE 500 MG
500 TABLET ORAL EVERY 12 HOURS
Refills: 0 | Status: DISCONTINUED | OUTPATIENT
Start: 2023-08-29 | End: 2023-08-31

## 2023-08-29 RX ADMIN — Medication 30 MILLILITER(S): at 11:09

## 2023-08-29 RX ADMIN — Medication 30 MILLIGRAM(S): at 17:11

## 2023-08-29 RX ADMIN — Medication 975 MILLIGRAM(S): at 22:30

## 2023-08-29 RX ADMIN — Medication 500 MILLIGRAM(S): at 22:14

## 2023-08-29 RX ADMIN — TRANEXAMIC ACID 220 MILLIGRAM(S): 100 INJECTION, SOLUTION INTRAVENOUS at 12:00

## 2023-08-29 RX ADMIN — Medication 975 MILLIGRAM(S): at 21:27

## 2023-08-29 RX ADMIN — Medication 1000 MILLIUNIT(S)/MIN: at 13:25

## 2023-08-29 RX ADMIN — FAMOTIDINE 20 MILLIGRAM(S): 10 INJECTION INTRAVENOUS at 11:09

## 2023-08-29 NOTE — CHART NOTE - NSCHARTNOTEFT_GEN_A_CORE
called by RN to evaluate patient, as her mepilex dressing was soaked  evaluated patient at bedside  mepilex dressing 80% saturated, removed  incision well approximated with steri strips in place, no active bleeding noted from incision  pressure dressing applied with tight abdominal binder  light lochia noted, no vaginal bleeding appreciated  will monitor closely

## 2023-08-30 ENCOUNTER — TRANSCRIPTION ENCOUNTER (OUTPATIENT)
Age: 28
End: 2023-08-30

## 2023-08-30 LAB
BASOPHILS # BLD AUTO: 0.04 K/UL — SIGNIFICANT CHANGE UP (ref 0–0.2)
BASOPHILS NFR BLD AUTO: 0.5 % — SIGNIFICANT CHANGE UP (ref 0–2)
EOSINOPHIL # BLD AUTO: 0.02 K/UL — SIGNIFICANT CHANGE UP (ref 0–0.5)
EOSINOPHIL NFR BLD AUTO: 0.2 % — SIGNIFICANT CHANGE UP (ref 0–6)
HCT VFR BLD CALC: 29.7 % — LOW (ref 34.5–45)
HGB BLD-MCNC: 8.9 G/DL — LOW (ref 11.5–15.5)
IMM GRANULOCYTES NFR BLD AUTO: 0.4 % — SIGNIFICANT CHANGE UP (ref 0–0.9)
LYMPHOCYTES # BLD AUTO: 2.64 K/UL — SIGNIFICANT CHANGE UP (ref 1–3.3)
LYMPHOCYTES # BLD AUTO: 31.2 % — SIGNIFICANT CHANGE UP (ref 13–44)
MCHC RBC-ENTMCNC: 24.2 PG — LOW (ref 27–34)
MCHC RBC-ENTMCNC: 30 GM/DL — LOW (ref 32–36)
MCV RBC AUTO: 80.7 FL — SIGNIFICANT CHANGE UP (ref 80–100)
MONOCYTES # BLD AUTO: 0.65 K/UL — SIGNIFICANT CHANGE UP (ref 0–0.9)
MONOCYTES NFR BLD AUTO: 7.7 % — SIGNIFICANT CHANGE UP (ref 2–14)
NEUTROPHILS # BLD AUTO: 5.09 K/UL — SIGNIFICANT CHANGE UP (ref 1.8–7.4)
NEUTROPHILS NFR BLD AUTO: 60 % — SIGNIFICANT CHANGE UP (ref 43–77)
PLATELET # BLD AUTO: 215 K/UL — SIGNIFICANT CHANGE UP (ref 150–400)
RBC # BLD: 3.68 M/UL — LOW (ref 3.8–5.2)
RBC # FLD: 21 % — HIGH (ref 10.3–14.5)
WBC # BLD: 8.47 K/UL — SIGNIFICANT CHANGE UP (ref 3.8–10.5)
WBC # FLD AUTO: 8.47 K/UL — SIGNIFICANT CHANGE UP (ref 3.8–10.5)

## 2023-08-30 RX ORDER — IBUPROFEN 200 MG
600 TABLET ORAL EVERY 6 HOURS
Refills: 0 | Status: DISCONTINUED | OUTPATIENT
Start: 2023-08-30 | End: 2023-08-31

## 2023-08-30 RX ORDER — IBUPROFEN 200 MG
1 TABLET ORAL
Qty: 28 | Refills: 0
Start: 2023-08-30 | End: 2023-09-05

## 2023-08-30 RX ORDER — ACETAMINOPHEN 500 MG
3 TABLET ORAL
Qty: 84 | Refills: 0
Start: 2023-08-30 | End: 2023-09-05

## 2023-08-30 RX ADMIN — Medication 600 MILLIGRAM(S): at 13:17

## 2023-08-30 RX ADMIN — Medication 975 MILLIGRAM(S): at 15:19

## 2023-08-30 RX ADMIN — Medication 30 MILLIGRAM(S): at 00:11

## 2023-08-30 RX ADMIN — Medication 1 TABLET(S): at 12:34

## 2023-08-30 RX ADMIN — Medication 975 MILLIGRAM(S): at 09:50

## 2023-08-30 RX ADMIN — Medication 975 MILLIGRAM(S): at 15:16

## 2023-08-30 RX ADMIN — Medication 500 MILLIGRAM(S): at 12:34

## 2023-08-30 RX ADMIN — Medication 600 MILLIGRAM(S): at 19:04

## 2023-08-30 RX ADMIN — Medication 30 MILLIGRAM(S): at 00:10

## 2023-08-30 RX ADMIN — Medication 975 MILLIGRAM(S): at 08:55

## 2023-08-30 RX ADMIN — Medication 30 MILLIGRAM(S): at 05:57

## 2023-08-30 RX ADMIN — Medication 975 MILLIGRAM(S): at 03:11

## 2023-08-30 RX ADMIN — Medication 975 MILLIGRAM(S): at 23:00

## 2023-08-30 RX ADMIN — Medication 600 MILLIGRAM(S): at 12:34

## 2023-08-30 RX ADMIN — Medication 975 MILLIGRAM(S): at 00:40

## 2023-08-30 RX ADMIN — ENOXAPARIN SODIUM 40 MILLIGRAM(S): 100 INJECTION SUBCUTANEOUS at 00:11

## 2023-08-30 RX ADMIN — Medication 975 MILLIGRAM(S): at 21:57

## 2023-08-30 NOTE — DISCHARGE NOTE OB - PLAN OF CARE
1. Please call your provider to schedule a postoperative wound check in 1 week. Contact information provided below.       2. Please take ibuprofen, acetaminophen, and other prescribed meds as needed for pain.       3. Patient is to continue with regular diet and activity as tolerated, nothing per vagina for 6 weeks ( including no sex, no tampons and no douching), and exclusive breast feeding for the first 6 months is recommended.       4. No tube bath or pools for 6 weeks. Patient can take showers. Please keep incision dry until your follow up appointment.      5. Please call office sooner with any signs and symptoms of infection such as fever (>100.4), chills, nausea or vomiting, redness or swelling at the incision site, fluid leakage or wound separation.

## 2023-08-30 NOTE — DISCHARGE NOTE OB - CALL YOUR HEALTHCARE PROVIDER IF YOU ARE EXPERIENCING SYMPTOMS OF DEPRESSION THAT LAST MORE THAN THREE DAYS
Apixaban/Eliquis - Compliance/Apixaban/Eliquis - Dietary Advice/Apixaban/Eliquis - Follow up monitoring/Apixaban/Eliquis - Potential for adverse drug reactions and interactions
Statement Selected

## 2023-08-30 NOTE — PROGRESS NOTE ADULT - ASSESSMENT
A/P:  27yo G P now POD# s/p  section 2/ to ** at ** weeks gestation  -Vital Signs Stable  -Postop CBC stable/pending  -Voiding, tolerating PO, bowel function nml   -Advance care as tolerated   -Continue routine postpartum/postoperative care and education.  -Ambulation encouraged, SCDs and lovenox for DVT ppx  -Healthy male infant, desires/declines circumcision.  -Healthy female infant    Dispo: Patient to be discharged when meeting all postpartum and postoperative milestone and pending attending approval.   A/P:  BIJU ORELLANA is a 29yo G P  now POD# s/p [facility] repeat  section at 39 weeks 0 days gestation      -BP slightly low at 97/68, otherwise stable vitals; no sign/symptoms of hypotension   -Postop H/H 8.9/29.7, preop 10.9/36.1  -Voiding, tolerating PO, no bowel movement, but +flatus   -Advance care as tolerated   -Continue routine postpartum/postoperative care and education.  -Ambulation encouraged, SCDs and lovenox for DVT ppx  -Healthy male infant, declines circumcision.      Dispo: Patient to be discharged when meeting all postpartum and postoperative milestone and pending attending approval.

## 2023-08-30 NOTE — DISCHARGE NOTE OB - MATERIALS PROVIDED
Vaccinations/[place]  Screening Program/  Immunization Record/Breastfeeding Log/Bottle Feeding Log/Back To Sleep Handout/Shaken Baby Prevention Handout/Breastfeeding Guide and Packet

## 2023-08-30 NOTE — PROGRESS NOTE ADULT - SUBJECTIVE AND OBJECTIVE BOX
BIJU ORELLANA is a 29yo G P  now POD# s/p  section  to ** at ** weeks gestation    S:    The patient has no complaints.   Pain controlled with medication   She is ambulating without difficulty  Tolerating PO.   + flatus/-BM/+ voiding   Lochia wnl/scant/heavy   Denies fever, chills, nausea, vomiting   Denies lightheadedness, dizziness, palpitation, chest pain, SOB   Breast/bottle feeding       O:    T(C): 36.6 (23 @ 04:00), Max: 36.7 (23 @ 00:00)  HR: 55 (23 @ 04:00) (54 - 97)  BP: 104/72 (23 @ 04:00) (104/72 - 128/88)  RR: 18 (23 @ 04:00) (15 - 24)  SpO2: 100% (23 @ 04:00) (97% - 100%)  Wt(kg): --    Gen: NAD, AOx3  CV: RRR  Pulm: CTAB  Breast: nontender, not engorged   Abdomen:  soft, appropriately tender, non-distended, +bowel sounds.  Uterus:  Fundus firm below umbilicus  Incision:  Clean/dry/intact with steri-strips/staples in place  VE:  +lochia  Ext:  Non-tender, no edema                           10.9   7.05  )-----------( 243      ( 28 Aug 2023 09:01 )             36.1              BIJU ORELLANA is a 27yo G P  now POD# s/p VA repeat  section at 39 weeks 0 days gestation     : Rachelle, # [id number], Amharic   S:    The patient has no complaints.   Pain controlled with medication   She is ambulating without difficulty  Tolerating PO.   + flatus/-BM/+ voiding   Lochia wnl  Denies fever, chills, nausea, vomiting   Denies lightheadedness, dizziness, palpitation, chest pain, SOB   Breast/bottle feeding       O:    T(C): 36.6 (23 @ 04:00), Max: 36.7 (23 @ 00:00)  HR: 55 (23 @ 04:00) (54 - 97)  BP: 104/72 (23 @ 04:00) (104/72 - 128/88)  RR: 18 (23 @ 04:00) (15 - 24)  SpO2: 100% (23 @ 04:00) (97% - 100%)  Wt(kg): --    Gen: NAD, AOx3  CV: RRR  Pulm: CTAB  Breast: nontender, not engorged   Abdomen:  soft, appropriately tender, non-distended, +bowel sounds.  Uterus:  Fundus firm below umbilicus  Incision:  Clean/dry/intact with steri-strips  VE:  +lochia  Ext:  Non-tender, no edema                           10.9   7.05  )-----------( 243      ( 28 Aug 2023 09:01 )             36.1

## 2023-08-30 NOTE — DISCHARGE NOTE OB - MEDICATION SUMMARY - MEDICATIONS TO TAKE
I will START or STAY ON the medications listed below when I get home from the hospital:    ibuprofen 600 mg oral tablet  -- 1 tab(s) by mouth every 6 hours  -- Indication: For pain control    acetaminophen 325 mg oral tablet  -- 3 tab(s) by mouth every 6 hours  -- Indication: For pain control    Prenatal 1 Plus 1 oral tablet  -- 1 tab(s) by mouth once a day  -- Indication: For nutritional supplement   I will START or STAY ON the medications listed below when I get home from the hospital:    ibuprofen 600 mg oral tablet  -- 1 tab(s) by mouth every 6 hours  -- Indication: For pain control    acetaminophen 325 mg oral tablet  -- 3 tab(s) by mouth every 6 hours  -- Indication: For pain control    Prenatal 1 Plus 1 oral tablet  -- 1 tab(s) by mouth once a day  -- Indication: For nutritional supplement    ferrous sulfate 325 mg (65 mg elemental iron) oral tablet  -- 1 tab(s) by mouth every 12 hours  -- Indication: For nutritional supplement   I will START or STAY ON the medications listed below when I get home from the hospital:    metroNIDAZOLE 500 mg oral tablet  -- 1 tab(s) by mouth every 12 hours  -- Indication: For Bacterial vaginosis    ibuprofen 600 mg oral tablet  -- 1 tab(s) by mouth every 6 hours  -- Indication: For pain control    acetaminophen 325 mg oral tablet  -- 3 tab(s) by mouth every 6 hours  -- Indication: For pain control    Prenatal 1 Plus 1 oral tablet  -- 1 tab(s) by mouth once a day  -- Indication: For nutritional supplement    ferrous sulfate 325 mg (65 mg elemental iron) oral tablet  -- 1 tab(s) by mouth every 12 hours  -- Indication: For nutritional supplement

## 2023-08-30 NOTE — DISCHARGE NOTE OB - CARE PROVIDER_API CALL
Bethany Parks  Obstetrics and Gynecology  [address] [place] [address]  Phone: [phone]  Fax: [phone]  Established Patient  Follow Up Time: 1 week

## 2023-08-30 NOTE — DISCHARGE NOTE OB - HOSPITAL COURSE
Patient underwent an uncomplicated vaccum assisted repeat LTCS and bilateral salpingectomy (please see operative note for details of procedure). EBL 400cc , H/H stable. Patient's postoperative course was unremarkable and she remained hemodynamically stable and afebrile throughout. Upon discharge, the patient is ambulating and voiding spontaneously, tolerating oral intake, pain was well controlled with oral medication, and vital signs were stable.  Patient underwent an uncomplicated vaccum assisted repeat LTCS and bilateral salpingectomy (please see operative note for details of procedure). EBL 400cc , H/H stable. Mild anemia noted placed on iron twice daily. Patient's postoperative course was unremarkable and she remained hemodynamically stable and afebrile throughout. Upon discharge, the patient is ambulating and voiding spontaneously, tolerating oral intake, pain was well controlled with oral medication, and vital signs were stable.

## 2023-08-30 NOTE — DISCHARGE NOTE OB - CRACKED, BLEEDING NIPPLES
Routing refill request to provider for review/approval because:  Labs not current:  CBC, ALT, AST    Visit is up to date. RN will issue one time 90 day mayelin refill. Please advise on labs.   Navin MYERS RN     Statement Selected

## 2023-08-30 NOTE — DISCHARGE NOTE OB - PAIN IN THE CALVES OF YOUR LEGS
Problem: Hypoglycemia ()  Goal: Glucose Stability  Outcome: Ongoing, Progressing     Problem: Infant Inpatient Plan of Care  Goal: Plan of Care Review  Outcome: Ongoing, Progressing  Flowsheets (Taken 2022 [id number])  Outcome Summary: DOL 1, VSS, infant sleepy and has not successfully  since birth, mother hand expressed colostrum drops into infant's mouth, lactation consult placed   Goal Outcome Evaluation:              Outcome Summary: DOL 1, VSS, infant sleepy and has not successfully  since birth, mother hand expressed colostrum drops into infant's mouth, lactation consult placed   Statement Selected

## 2023-08-30 NOTE — DISCHARGE NOTE OB - PATIENT PORTAL LINK FT
You can access the FollowMyHealth Patient Portal offered by [facility] by registering at the following website: [email or web address]. By joining [employer]’s FollowMyHealth portal, you will also be able to view your health information using other applications (apps) compatible with our system.

## 2023-08-30 NOTE — DISCHARGE NOTE OB - CARE PLAN
Principal Discharge DX:	S/P repeat low transverse   Assessment and plan of treatment:	1. Please call your provider to schedule a postoperative wound check in 1 week. Contact information provided below.       2. Please take ibuprofen, acetaminophen, and other prescribed meds as needed for pain.       3. Patient is to continue with regular diet and activity as tolerated, nothing per vagina for 6 weeks ( including no sex, no tampons and no douching), and exclusive breast feeding for the first 6 months is recommended.       4. No tube bath or pools for 6 weeks. Patient can take showers. Please keep incision dry until your follow up appointment.      5. Please call office sooner with any signs and symptoms of infection such as fever (>100.4), chills, nausea or vomiting, redness or swelling at the incision site, fluid leakage or wound separation.   1

## 2023-08-30 NOTE — DISCHARGE NOTE OB - MEDICATION SUMMARY - MEDICATIONS TO CHANGE
I will SWITCH the dose or number of times a day I take the medications listed below when I get home from the hospital:    Prenatal 1 Plus 1 oral tablet  -- 1 tab(s) by mouth once a day

## 2023-08-31 VITALS
TEMPERATURE: 98 F | SYSTOLIC BLOOD PRESSURE: 117 MMHG | RESPIRATION RATE: 16 BRPM | DIASTOLIC BLOOD PRESSURE: 65 MMHG | HEART RATE: 70 BPM

## 2023-08-31 RX ORDER — METRONIDAZOLE 500 MG
1 TABLET ORAL
Qty: 10 | Refills: 0
Start: 2023-08-31 | End: 2023-09-04

## 2023-08-31 RX ORDER — FERROUS SULFATE 325(65) MG
1 TABLET ORAL
Qty: 0 | Refills: 0 | DISCHARGE
Start: 2023-08-31

## 2023-08-31 RX ORDER — FERROUS SULFATE 325(65) MG
325 TABLET ORAL EVERY 12 HOURS
Refills: 0 | Status: DISCONTINUED | OUTPATIENT
Start: 2023-08-31 | End: 2023-08-31

## 2023-08-31 RX ADMIN — ENOXAPARIN SODIUM 40 MILLIGRAM(S): 100 INJECTION SUBCUTANEOUS at 00:31

## 2023-08-31 RX ADMIN — Medication 975 MILLIGRAM(S): at 03:47

## 2023-08-31 RX ADMIN — Medication 600 MILLIGRAM(S): at 06:17

## 2023-08-31 RX ADMIN — Medication 1 TABLET(S): at 10:00

## 2023-08-31 RX ADMIN — Medication 600 MILLIGRAM(S): at 07:07

## 2023-08-31 RX ADMIN — Medication 975 MILLIGRAM(S): at 10:00

## 2023-08-31 RX ADMIN — Medication 975 MILLIGRAM(S): at 03:17

## 2023-08-31 RX ADMIN — Medication 500 MILLIGRAM(S): at 00:31

## 2023-08-31 NOTE — PROGRESS NOTE ADULT - ASSESSMENT
A/P: BIJU ORELLANA is a 28y  s/p VArCS+BS @ 39w  POD2    #Routine postpartum care  - Stable, doing well postpartum  - Hgb 10.9 > 8.9  - Pain: well controlled, c/w current regimen  - GI: c/w regular diet, normal bowel function  - : voiding, lochia decreasing   - DVT ppx: SCDs, ambulation encouraged, lovenox    A/P: BIJU ORELLANA is a 28y  s/p VArCS+BS @ 39w  POD2    #Routine postpartum care  - Stable, doing well postpartum  - Hgb 10.9 > 8.9, mild anemia to take iron bid at home with prenatal vitamins  - Pain: well controlled, c/w current regimen  - GI: c/w regular diet, normal bowel function  - : voiding, lochia decreasing   - DVT ppx: SCDs, ambulation encouraged, lovenox   - Stable for discharge home with follow up in one week for wound check

## 2023-08-31 NOTE — PROGRESS NOTE ADULT - ATTENDING COMMENTS
patient voiding and ambulating with no issues, incision well approximated with steristrips in place, mild anemia noted, placed on iron bid, patient stable for discharge home with close follow up in 1 week for wound check.
Agree with assessment and plan as above.

## 2023-08-31 NOTE — PROGRESS NOTE ADULT - SUBJECTIVE AND OBJECTIVE BOX
BIJU ORELLANA is a 28y  s/p VArCS+BS @ 39w  POD1    SUBJECTIVE:  No acute events overnight.  Patient has no complaints.  Pain is well controlled.  +flatus, + voiding, -BM.  Ambulating and tolerating PO.  Appropriate lochia.  Denies fever, chills, nausea, and vomiting.  She denies lightheadedness, dizziness, HA, blurry vision, palpitations, chest pain and SOB.     OBJECTIVE:  Physical exam:  General: AOx3, NAD.  Heart: RRR  Lungs: CTAB  Abdomen: Soft, appropriately tender to palpitation, fundus firm.  Incision: Dressing removed revealing clean, dry and intact incision ,  in place **  : lara in place , normal urine **  Vaginal: expectant lochia  Ext: No DVT signs, warm extremities.    Vital Signs Last 24 Hrs  T(C): 36.9 (30 Aug 2023 23:26), Max: 36.9 (30 Aug 2023 23:26)  T(F): 98.4 (30 Aug 2023 23:26), Max: 98.4 (30 Aug 2023 23:26)  HR: 72 (30 Aug 2023 23:26) (68 - 79)  BP: 116/66 (30 Aug 2023 23:26) (97/68 - 116/66)  BP(mean): 70 (30 Aug 2023 16:02) (70 - 70)  RR: 16 (30 Aug 2023 23:26) (16 - 18)  SpO2: 100% (30 Aug 2023 23:26) (99% - 100%)    Parameters below as of 30 Aug 2023 23:26  Patient On (Oxygen Delivery Method): room air              LABS:                        8.9    8.47  )-----------( 215      ( 30 Aug 2023 07:44 )             29.7       A/P:     #Routine postpartum care  - Stable, doing well postpartum  - Hgb **  - Pain: well controlled, c/w current regimen  - GI: c/w regular diet, normal bowel function**  - : voiding, lochia decreasing **  - DVT ppx: SCDs, ambulation encouraged, lovenox **  - Healthy baby *** ,  Circ pending **  - Dispo: will reassess in am BIJU ORELLANA is a 28y  s/p VArCS+BS @ 39w  POD2    SUBJECTIVE:  No acute events overnight.  Patient has no complaints.  Pain is well controlled.  +flatus, + voiding, +BM.  Ambulating and tolerating PO.  Appropriate lochia.    OBJECTIVE:  Physical exam:  Abdomen: Soft, appropriately tender to palpitation, fundus firm.  Incision: Dressing removed revealing clean, dry and intact incision ,  in place   : lara in place , normal urine  Vaginal: expectant lochia  Ext: No DVT signs, warm extremities.    Vital Signs Last 24 Hrs  T(C): 36.9 (30 Aug 2023 23:26), Max: 36.9 (30 Aug 2023 23:26)  T(F): 98.4 (30 Aug 2023 23:26), Max: 98.4 (30 Aug 2023 23:26)  HR: 72 (30 Aug 2023 23:26) (68 - 79)  BP: 116/66 (30 Aug 2023 23:26) (97/68 - 116/66)  BP(mean): 70 (30 Aug 2023 16:02) (70 - 70)  RR: 16 (30 Aug 2023 23:26) (16 - 18)  SpO2: 100% (30 Aug 2023 23:26) (99% - 100%)    Parameters below as of 30 Aug 2023 23:26  Patient On (Oxygen Delivery Method): room air              LABS:                        8.9    8.47  )-----------( 215      ( 30 Aug 2023 07:44 )             29.7

## 2023-09-06 DIAGNOSIS — O23.593 INFECTION OF OTHER PART OF GENITAL TRACT IN PREGNANCY, THIRD TRIMESTER: ICD-10-CM

## 2023-09-06 DIAGNOSIS — Z30.2 ENCOUNTER FOR STERILIZATION: ICD-10-CM

## 2023-09-06 DIAGNOSIS — D62 ACUTE POSTHEMORRHAGIC ANEMIA: ICD-10-CM

## 2023-09-06 DIAGNOSIS — I95.9 HYPOTENSION, UNSPECIFIED: ICD-10-CM

## 2023-09-06 DIAGNOSIS — Z28.09 IMMUNIZATION NOT CARRIED OUT BECAUSE OF OTHER CONTRAINDICATION: ICD-10-CM

## 2023-09-06 DIAGNOSIS — O34.211 MATERNAL CARE FOR LOW TRANSVERSE SCAR FROM PREVIOUS CESAREAN DELIVERY: ICD-10-CM

## 2023-09-06 DIAGNOSIS — N76.0 ACUTE VAGINITIS: ICD-10-CM

## 2023-09-06 DIAGNOSIS — Z3A.39 39 WEEKS GESTATION OF PREGNANCY: ICD-10-CM

## 2023-09-07 LAB — SURGICAL PATHOLOGY STUDY: SIGNIFICANT CHANGE UP

## 2024-08-01 NOTE — PATIENT PROFILE OB - BABY A: APGAR 1 MIN
TRANSFER - IN REPORT:    Verbal report received from Kate LEBLANC on Lillian YANIRA Shine  being received from CCU Room 2511 for ordered procedure      Report consisted of patient's Situation, Background, Assessment and   Recommendations(SBAR).     Information from the following report(s) Nurse Handoff Report, Intake/Output, MAR, Recent Results, Med Rec Status, Cardiac Rhythm NSR, and Procedure Verification was reviewed with the receiving nurse.    Opportunity for questions and clarification was provided.      Assessment completed upon patient's arrival to unit and care assumed.      9